# Patient Record
Sex: FEMALE | Race: BLACK OR AFRICAN AMERICAN
[De-identification: names, ages, dates, MRNs, and addresses within clinical notes are randomized per-mention and may not be internally consistent; named-entity substitution may affect disease eponyms.]

---

## 2019-07-27 ENCOUNTER — HOSPITAL ENCOUNTER (OUTPATIENT)
Dept: HOSPITAL 62 - LC | Age: 24
Discharge: HOME | End: 2019-07-27
Attending: OBSTETRICS & GYNECOLOGY
Payer: OTHER GOVERNMENT

## 2019-07-27 DIAGNOSIS — Z3A.37: ICD-10-CM

## 2019-07-27 DIAGNOSIS — O47.1: Primary | ICD-10-CM

## 2019-07-27 LAB
APPEARANCE UR: (no result)
APTT PPP: (no result) S
BARBITURATES UR QL SCN: NEGATIVE
BILIRUB UR QL STRIP: NEGATIVE
CHLAM PCR: NOT DETECTED
GLUCOSE UR STRIP-MCNC: NEGATIVE MG/DL
KETONES UR STRIP-MCNC: (no result) MG/DL
METHADONE UR QL SCN: NEGATIVE
NITRITE UR QL STRIP: NEGATIVE
PCP UR QL SCN: NEGATIVE
PH UR STRIP: 6 [PH] (ref 5–9)
PROT UR STRIP-MCNC: 30 MG/DL
SP GR UR STRIP: 1.03
URINE AMPHETAMINES SCREEN: NEGATIVE
URINE BENZODIAZEPINES SCREEN: NEGATIVE
URINE COCAINE SCREEN: NEGATIVE
URINE MARIJUANA (THC) SCREEN: NEGATIVE
UROBILINOGEN UR-MCNC: 2 MG/DL (ref ?–2)

## 2019-07-27 PROCEDURE — 81005 URINALYSIS: CPT

## 2019-07-27 PROCEDURE — 80307 DRUG TEST PRSMV CHEM ANLYZR: CPT

## 2019-07-27 PROCEDURE — 4A1HXCZ MONITORING OF PRODUCTS OF CONCEPTION, CARDIAC RATE, EXTERNAL APPROACH: ICD-10-PCS | Performed by: OBSTETRICS & GYNECOLOGY

## 2019-07-27 PROCEDURE — 87081 CULTURE SCREEN ONLY: CPT

## 2019-07-27 PROCEDURE — 87591 N.GONORRHOEAE DNA AMP PROB: CPT

## 2019-07-27 PROCEDURE — 87491 CHLMYD TRACH DNA AMP PROBE: CPT

## 2019-07-27 PROCEDURE — 59025 FETAL NON-STRESS TEST: CPT

## 2023-05-05 NOTE — NON STRESS TEST REPORT
=================================================================

Non Stress Test

=================================================================

Datetime Report Generated by CPN: 07/27/2019 07:05

   

   

=================================================================

DEMOGRAPHIC

=================================================================

   

EGA NST:  37.3

   

=================================================================

INDICATION

=================================================================

   

Indication for Study:  Other

Indication for Study (NST) Other:  labor check

   

=================================================================

MONITORING

=================================================================

   

Monitor Explained:  Monitor Explained; Test Explained; Patient

   Verbalized Understanding

Time on Monitor:  07/27/2019 02:06

Time off Monitor:  07/27/2019 06:35

NST Duration:  269

   

=================================================================

NST INTERVENTIONS

=================================================================

   

NST Interventions:  None

Physician Notified NST:  Dr Younger

BABY A:  [id number]

   

=================================================================

BABY A

=================================================================

   

Fetal Movement :  Present

Contraction Frequency :  none

Accelerations :  15X15

Decelerations :  None

Variability :  Moderate 6-25bpm

NST Review:  Does Not Meet Criteria for Reactive NST

NST Review and Verified By :  B. Ring RN

NST Results:  Reactive

   

=================================================================

NST REPORT

=================================================================

   

Report Trigger:  Send Report
No